# Patient Record
Sex: FEMALE
[De-identification: names, ages, dates, MRNs, and addresses within clinical notes are randomized per-mention and may not be internally consistent; named-entity substitution may affect disease eponyms.]

---

## 2018-09-21 PROBLEM — Z00.00 ENCOUNTER FOR PREVENTIVE HEALTH EXAMINATION: Status: ACTIVE | Noted: 2018-09-21

## 2019-01-10 ENCOUNTER — APPOINTMENT (OUTPATIENT)
Dept: HEPATOLOGY | Facility: CLINIC | Age: 64
End: 2019-01-10

## 2019-10-14 ENCOUNTER — APPOINTMENT (OUTPATIENT)
Dept: HEPATOLOGY | Facility: CLINIC | Age: 64
End: 2019-10-14

## 2019-11-18 ENCOUNTER — APPOINTMENT (OUTPATIENT)
Dept: HEPATOLOGY | Facility: CLINIC | Age: 64
End: 2019-11-18

## 2020-01-06 ENCOUNTER — APPOINTMENT (OUTPATIENT)
Dept: HEPATOLOGY | Facility: CLINIC | Age: 65
End: 2020-01-06
Payer: MEDICARE

## 2020-01-06 VITALS
HEART RATE: 62 BPM | WEIGHT: 132 LBS | DIASTOLIC BLOOD PRESSURE: 75 MMHG | BODY MASS INDEX: 25.91 KG/M2 | TEMPERATURE: 97.7 F | SYSTOLIC BLOOD PRESSURE: 128 MMHG | HEIGHT: 60 IN

## 2020-01-06 DIAGNOSIS — G47.00 INSOMNIA, UNSPECIFIED: ICD-10-CM

## 2020-01-06 DIAGNOSIS — Z78.9 OTHER SPECIFIED HEALTH STATUS: ICD-10-CM

## 2020-01-06 DIAGNOSIS — F32.9 MAJOR DEPRESSIVE DISORDER, SINGLE EPISODE, UNSPECIFIED: ICD-10-CM

## 2020-01-06 DIAGNOSIS — M19.90 UNSPECIFIED OSTEOARTHRITIS, UNSPECIFIED SITE: ICD-10-CM

## 2020-01-06 DIAGNOSIS — R07.9 CHEST PAIN, UNSPECIFIED: ICD-10-CM

## 2020-01-06 DIAGNOSIS — Z87.19 PERSONAL HISTORY OF OTHER DISEASES OF THE DIGESTIVE SYSTEM: ICD-10-CM

## 2020-01-06 DIAGNOSIS — Z87.898 PERSONAL HISTORY OF OTHER SPECIFIED CONDITIONS: ICD-10-CM

## 2020-01-06 DIAGNOSIS — R51 HEADACHE: ICD-10-CM

## 2020-01-06 LAB
ALBUMIN SERPL ELPH-MCNC: 4.1 G/DL
ALP BLD-CCNC: 75 U/L
ALT SERPL-CCNC: 12 U/L
ANION GAP SERPL CALC-SCNC: 12 MMOL/L
AST SERPL-CCNC: 18 U/L
BASOPHILS # BLD AUTO: 0.08 K/UL
BASOPHILS NFR BLD AUTO: 1.3 %
BILIRUB SERPL-MCNC: 0.3 MG/DL
BUN SERPL-MCNC: 22 MG/DL
CALCIUM SERPL-MCNC: 9.3 MG/DL
CHLORIDE SERPL-SCNC: 107 MMOL/L
CHOLEST SERPL-MCNC: 201 MG/DL
CHOLEST/HDLC SERPL: 4.7 RATIO
CO2 SERPL-SCNC: 22 MMOL/L
CREAT SERPL-MCNC: 0.9 MG/DL
DEPRECATED KAPPA LC FREE/LAMBDA SER: 1.59 RATIO
EOSINOPHIL # BLD AUTO: 0.15 K/UL
EOSINOPHIL NFR BLD AUTO: 2.5 %
ESTIMATED AVERAGE GLUCOSE: 123 MG/DL
FERRITIN SERPL-MCNC: 37 NG/ML
GGT SERPL-CCNC: 14 U/L
GLUCOSE SERPL-MCNC: 82 MG/DL
HBA1C MFR BLD HPLC: 5.9 %
HBV CORE IGG+IGM SER QL: NONREACTIVE
HBV SURFACE AB SER QL: NONREACTIVE
HBV SURFACE AG SER QL: NONREACTIVE
HCT VFR BLD CALC: 38.8 %
HCV AB SER QL: NONREACTIVE
HCV S/CO RATIO: 0.09 S/CO
HDLC SERPL-MCNC: 43 MG/DL
HEPATITIS A IGG ANTIBODY: REACTIVE
HGB BLD-MCNC: 12.6 G/DL
IGA SER QL IEP: 143 MG/DL
IGG SER QL IEP: 938 MG/DL
IGM SER QL IEP: 72 MG/DL
IMM GRANULOCYTES NFR BLD AUTO: 0.3 %
INR PPP: 1.08 RATIO
IRON SATN MFR SERPL: 25 %
IRON SERPL-MCNC: 88 UG/DL
KAPPA LC CSF-MCNC: 1.13 MG/DL
KAPPA LC SERPL-MCNC: 1.8 MG/DL
LDLC SERPL CALC-MCNC: 104 MG/DL
LYMPHOCYTES # BLD AUTO: 2.08 K/UL
LYMPHOCYTES NFR BLD AUTO: 34.6 %
MAN DIFF?: NORMAL
MCHC RBC-ENTMCNC: 29.9 PG
MCHC RBC-ENTMCNC: 32.5 GM/DL
MCV RBC AUTO: 91.9 FL
MONOCYTES # BLD AUTO: 0.4 K/UL
MONOCYTES NFR BLD AUTO: 6.7 %
NEUTROPHILS # BLD AUTO: 3.28 K/UL
NEUTROPHILS NFR BLD AUTO: 54.6 %
PLATELET # BLD AUTO: 264 K/UL
POTASSIUM SERPL-SCNC: 4.5 MMOL/L
PROT SERPL-MCNC: 6.6 G/DL
PT BLD: 12.3 SEC
RBC # BLD: 4.22 M/UL
RBC # FLD: 12.5 %
SODIUM SERPL-SCNC: 141 MMOL/L
TIBC SERPL-MCNC: 357 UG/DL
TRIGL SERPL-MCNC: 268 MG/DL
UIBC SERPL-MCNC: 269 UG/DL
WBC # FLD AUTO: 6.01 K/UL

## 2020-01-06 PROCEDURE — 99204 OFFICE O/P NEW MOD 45 MIN: CPT

## 2020-01-06 RX ORDER — ZOLPIDEM TARTRATE 5 MG/1
TABLET, FILM COATED ORAL
Refills: 0 | Status: ACTIVE | COMMUNITY

## 2020-01-06 NOTE — CONSULT LETTER
[Dear  ___] : Dear  [unfilled], [Consult Letter:] : I had the pleasure of evaluating your patient, [unfilled]. [Please see my note below.] : Please see my note below. [Consult Closing:] : Thank you very much for allowing me to participate in the care of this patient.  If you have any questions, please do not hesitate to contact me. [Sincerely,] : Sincerely, [FreeTextEntry2] : Katy Griggs - PCP, Internal medicine [place] [FreeTextEntry3] : Romulo Weiss MD\par , [facility]\par General Hepatology and Gastroenterology\par [facility]\par [facility]\par [address] [place] [address]\[place] [address]\par [phone]\par

## 2020-01-06 NOTE — HISTORY OF PRESENT ILLNESS
[de-identified] : Ms. NGUYEN is a 64 year old woman with h/o alcohol abuse, overweight, back pain, abdominal pain, depression, insomnia, who was referred by her PCP, Dr. Griggs. The patient tells me that cirrhosis was found by Dr. Karel Mckeon (GI), who did and EGD and colonoscopy. Records are not available. She thinks they were done around March 2019. She is unsure of what the exams showed. She reports 2 years of upper abdominal pain before and after eating, s.t. severe. It is worse with greasy food, which she avoid as it causes vomiting. She mainly eats oatmeal, bread, and Ensure. Once a month, she tries rice. S.t. has diarrhea. \par FHx: no liver disease\par Alcohol hx: drank heavily for 5 years until 2005, about 1 bottle of vodka daily. Before that, drank less, but still sometimes more. None at all since 2005.\par Weight hx: 132 lbs, BMI 25.8 as of 1/2020. Max.  lbs, never significantly heavier.

## 2020-01-06 NOTE — ASSESSMENT
[FreeTextEntry1] : \par \par - reportedly found cirrhosis\par - h/o heavy alcohol intake for 5 years, last alcohol 2005\par - h/o melena, s/p EGD and colonoscopy around 3/2019 elsewhere\par - upper abdominal pain, dyspepsia and tenderness, worse with greasy food, before and after eating, occas. diarrhea, about once a week - likely chronic pancreatitis. Did lose 8 lbs in 1 year because of this.\par \par Plan:\par - labs as below\par - US abdomen\par - fibroscan\par - pt. will provide records of EGD and colonoscopy\par - return in 1 month

## 2020-01-06 NOTE — REVIEW OF SYSTEMS
[Abdominal Pain] : abdominal pain [Arthralgias] : arthralgias [Negative] : Heme/Lymph [FreeTextEntry7] : epigastric

## 2020-01-06 NOTE — PHYSICAL EXAM
[Liver Palpable ___ Finger Breadths Below Costal Margin] : Liver edge was palpable [unfilled] finger breadths below costal margin [General Appearance - Alert] : alert [General Appearance - In No Acute Distress] : in no acute distress [PERRL With Normal Accommodation] : pupils were equal in size, round, and reactive to light [Extraocular Movements] : extraocular movements were intact [Sclera] : the sclera and conjunctiva were normal [Oropharynx] : the oropharynx was normal [Outer Ear] : the ears and nose were normal in appearance [Neck Appearance] : the appearance of the neck was normal [Thyroid Diffuse Enlargement] : the thyroid was not enlarged [Neck Cervical Mass (___cm)] : no neck mass was observed [Jugular Venous Distention Increased] : there was no jugular-venous distention [Thyroid Nodule] : there were no palpable thyroid nodules [Auscultation Breath Sounds / Voice Sounds] : lungs were clear to auscultation bilaterally [Heart Rate And Rhythm] : heart rate was normal and rhythm regular [Heart Sounds Gallop] : no gallops [Heart Sounds] : normal S1 and S2 [Murmurs] : no murmurs [Full Pulse] : the pedal pulses are present [Heart Sounds Pericardial Friction Rub] : no pericardial rub [Edema] : there was no peripheral edema [Bowel Sounds] : normal bowel sounds [Abdomen Tenderness] : non-tender [Abdomen Soft] : soft [Cervical Lymph Nodes Enlarged Posterior Bilaterally] : posterior cervical [Abdomen Mass (___ Cm)] : no abdominal mass palpated [Cervical Lymph Nodes Enlarged Anterior Bilaterally] : anterior cervical [Supraclavicular Lymph Nodes Enlarged Bilaterally] : supraclavicular [Axillary Lymph Nodes Enlarged Bilaterally] : axillary [Femoral Lymph Nodes Enlarged Bilaterally] : femoral [Inguinal Lymph Nodes Enlarged Bilaterally] : inguinal [No CVA Tenderness] : no ~M costovertebral angle tenderness [No Spinal Tenderness] : no spinal tenderness [Nail Clubbing] : no clubbing  or cyanosis of the fingernails [Abnormal Walk] : normal gait [Musculoskeletal - Swelling] : no joint swelling seen [Motor Tone] : muscle strength and tone were normal [Skin Color & Pigmentation] : normal skin color and pigmentation [Skin Turgor] : normal skin turgor [] : no rash [Sensation] : the sensory exam was normal to light touch and pinprick [Deep Tendon Reflexes (DTR)] : deep tendon reflexes were 2+ and symmetric [No Focal Deficits] : no focal deficits [Impaired Insight] : insight and judgment were intact [Oriented To Time, Place, And Person] : oriented to person, place, and time [Affect] : the affect was normal [Splenomegaly] : no splenomegaly [Scleral Icterus] : No Scleral Icterus [Ascites Fluid Wave] : no ascites fluid wave [Jaundice] : No jaundice [Asterixis] : no asterixis observed [Palmar Erythema] : no Palmar Erythema [Depression] : no depression

## 2020-01-07 LAB
MITOCHONDRIA AB SER IF-ACNC: NORMAL
SMOOTH MUSCLE AB SER QL IF: NORMAL

## 2020-01-08 LAB — ANA SER IF-ACNC: NEGATIVE

## 2020-01-09 LAB
A1AT PHENOTYP SERPL-IMP: NORMAL BANDS
A1AT SERPL-MCNC: 138 MG/DL

## 2020-02-14 ENCOUNTER — APPOINTMENT (OUTPATIENT)
Dept: HEPATOLOGY | Facility: CLINIC | Age: 65
End: 2020-02-14
Payer: MEDICARE

## 2020-02-14 VITALS
DIASTOLIC BLOOD PRESSURE: 71 MMHG | RESPIRATION RATE: 14 BRPM | TEMPERATURE: 98.5 F | WEIGHT: 131 LBS | SYSTOLIC BLOOD PRESSURE: 114 MMHG | BODY MASS INDEX: 25.72 KG/M2 | HEIGHT: 60 IN | HEART RATE: 60 BPM

## 2020-02-14 DIAGNOSIS — K70.9 ALCOHOLIC LIVER DISEASE, UNSPECIFIED: ICD-10-CM

## 2020-02-14 DIAGNOSIS — K74.0 HEPATIC FIBROSIS: ICD-10-CM

## 2020-02-14 DIAGNOSIS — R10.9 UNSPECIFIED ABDOMINAL PAIN: ICD-10-CM

## 2020-02-14 DIAGNOSIS — E78.00 PURE HYPERCHOLESTEROLEMIA, UNSPECIFIED: ICD-10-CM

## 2020-02-14 DIAGNOSIS — R10.13 EPIGASTRIC PAIN: ICD-10-CM

## 2020-02-14 DIAGNOSIS — E66.3 OVERWEIGHT: ICD-10-CM

## 2020-02-14 DIAGNOSIS — K76.0 FATTY (CHANGE OF) LIVER, NOT ELSEWHERE CLASSIFIED: ICD-10-CM

## 2020-02-14 DIAGNOSIS — E78.1 PURE HYPERGLYCERIDEMIA: ICD-10-CM

## 2020-02-14 PROCEDURE — 99214 OFFICE O/P EST MOD 30 MIN: CPT | Mod: 25

## 2020-02-14 PROCEDURE — ZZZZZ: CPT

## 2020-02-14 PROCEDURE — 91200 LIVER ELASTOGRAPHY: CPT

## 2020-02-14 NOTE — CONSULT LETTER
[Consult Letter:] : I had the pleasure of evaluating your patient, [unfilled]. [Dear  ___] : Dear  [unfilled], [Please see my note below.] : Please see my note below. [Sincerely,] : Sincerely, [Consult Closing:] : Thank you very much for allowing me to participate in the care of this patient.  If you have any questions, please do not hesitate to contact me. [FreeTextEntry2] : Katy Griggs - PCP, Internal medicine [place] [FreeTextEntry3] : Romulo Weiss MD\par , [facility]\par General Hepatology and Gastroenterology\par [email or web address] for [facility]\par [facility]\par [address] [place] [address]\[place] [address]\par [phone]\par

## 2020-02-14 NOTE — ASSESSMENT
[FreeTextEntry1] : Ms. NGUYEN is a 64 year old woman with h/o alcohol abuse, overweight, back pain, abdominal pain, depression, insomnia, who was referred in 1/2020 by her PCP, Dr. Griggs after cirrhosis was found around 3/2019. She had 2 years of upper abdominal pain. FHx: no liver disease. Alcohol hx: drank heavily for 5 years until 2005, about 1 bottle of vodka daily. Before that, drank less, but still sometimes more. None at all since 2005. Weight hx: 132 lbs, BMI 25.8 as of 1/2020. Max.  lbs, never significantly heavier.\par . Records are not available. She thinks they were done around March 2019. She is unsure of what the exams showed.  \par \par \par - liver fibrosis, stage 2 of 4 found by fibroscan 2/2020. Likley due to COY/ERWIN. May have had alcoholic cirrhosis in the past that improved after she stopped drinking.  H/o alcohol abuse until 2005\par - NAFLD, stage 3 of 3 suggested by fibroscan. May have ERWIN, but since liver enzymes are normal, would need biopsy to determine.\par - had EGD/colonoscopy done around 1/2019 by Dr. Karel Mckeon which she does not follow anymore, for melena. Results not available.\par - MELD-Na 7 on 1/7/20\par - LFTs normal 1/2020\par - glucose intolerance\par - mixed hyperlipidemia\par - h/o heavy alcohol intake for 5 years, last alcohol 2005\par \par - upper abdominal pain, dyspepsia and tenderness, worse with greasy food, before and after eating - likely chronic pancreatitis, less likely IBs. Did lose 8 lbs in 1 year because of this, recently stable.\par - immune to hepatitis A, not immune to hepatitis B\par Plan:\par - NAFLD: recommended 10 % weight loss\par - HCC screening for bridging fibrosis, also suspect that she had cirrhosis which improved after she stopped drinking: US in 6 months\par - GERD: return in 6 months\par - pt. will provide records of EGD and colonoscopy\par - vaccinate against hepatitis B recommended

## 2020-02-14 NOTE — REVIEW OF SYSTEMS
[Arthralgias] : arthralgias [Abdominal Pain] : abdominal pain [Negative] : Heme/Lymph [FreeTextEntry7] : epigastric

## 2020-02-14 NOTE — HISTORY OF PRESENT ILLNESS
[de-identified] : - 2/14/20: returns after US, fibroscan today which I interpreted and discussed, and labs. Again reports intermittent, daily pain in upper abdomen, mild-moderate, worse after greasy food and when she is stressed out or angry, not improved after BM, partially improved since taking daily Nexium. Today denies diarrhea.\par \par - 1/2020 initial visit: Ms. NGUYEN is a 64 year old woman with h/o alcohol abuse, overweight, back pain, abdominal pain, depression, insomnia, who was referred by her PCP, Dr. Griggs. The patient tells me that cirrhosis was found by Dr. Karel Mckeon (GI), who did an EGD and a colonoscopy. Records are not available. She thinks they were done around March 2019. She is unsure of what the exams showed. She reports 2 years of upper abdominal pain before and after eating, s.t. severe. It is worse with greasy food, which she avoid as it causes vomiting. She mainly eats oatmeal, bread, and Ensure. Once a month, she tries rice. S.t. has diarrhea. \par FHx: no liver disease\par Alcohol hx: drank heavily for 5 years until 2005, about 1 bottle of vodka daily. Before that, drank less, but still sometimes more. None at all since 2005.\par Weight hx: 132 lbs, BMI 25.8 as of 1/2020. Max.  lbs, never significantly heavier.

## 2020-08-14 ENCOUNTER — APPOINTMENT (OUTPATIENT)
Dept: HEPATOLOGY | Facility: CLINIC | Age: 65
End: 2020-08-14

## 2021-06-21 ENCOUNTER — APPOINTMENT (OUTPATIENT)
Dept: HEPATOLOGY | Facility: CLINIC | Age: 66
End: 2021-06-21

## 2021-07-15 ENCOUNTER — APPOINTMENT (OUTPATIENT)
Dept: HEPATOLOGY | Facility: CLINIC | Age: 66
End: 2021-07-15